# Patient Record
Sex: FEMALE
[De-identification: names, ages, dates, MRNs, and addresses within clinical notes are randomized per-mention and may not be internally consistent; named-entity substitution may affect disease eponyms.]

---

## 2022-12-05 ENCOUNTER — HOSPITAL ENCOUNTER (EMERGENCY)
Dept: HOSPITAL 97 - ER | Age: 22
Discharge: LEFT BEFORE DISCHARGE | End: 2022-12-05
Payer: COMMERCIAL

## 2022-12-05 DIAGNOSIS — Z02.9: Primary | ICD-10-CM

## 2022-12-05 NOTE — XMS REPORT
Continuity of Care Document

                           Created on:2022



Patient:ARTEMIO TRIVEDI

Sex:Female

:2000

External Reference #:[id number]





Demographics







                          Address                   [address] [address]



                                                    [place] [address]

 

                          Home Phone                [phone]

 

                          Work Phone                [phone]

 

                          Mobile Phone              [phone]

 

                          Email Address             NONE

 

                          Preferred Language        en

 

                          Marital Status            Unknown

 

                          M[employer]on Affiliation     Unknown

 

                          Race                      Unknown

 

                          Additional Race(s)        White

 

                          Ethnic Group               or 









Author







                          Organization              [facility]

t

 

                          Address                   [address]



                                                    [place] [address]

 

                          Phone                     [phone]









Support







                Name            Relationship    Address         Phone

 

                Penny Perez Mother          [id number] [phone]



                                                [place] [address] 

 

                Penny Trivedi Mother          [id number] [phone]



                                                [place] [address] 

 

                Scott Medellin  Significant Other [address] [facility]  [phone]

[phone]



                                                [place] [address] 









Care Team Providers







                    Name                Role                Phone

 

                    Pcp, Patient Does Not Have A Primary Care Physician [phone]



 

                    Rory Razo Attending Clinician [phone]

 

                    Nurse, Amilcar Rmchp Exp Cprit Obgyn Attending Clinician Unavail

able

 

                    RORY SALDAÑA  Attending Clinician Unavailable

 

                    Doctor Unassigned, No Name Attending Clinician Unavailable

 

                    CODIE GALLAGHER Attending Clinician Unavailable

 

                    BHARAT VILLALTA   Attending Clinician Unavailable

 

                    Destiny Perera DO   Attending Clinician [phone]

 

                    Bharat Villalta MD Attending Clinician [phone]

 

                    Gillian Alvarez MD Attending Clinician [phone]

 

                    Scarlet Lopez MD Attending Clinician [phone]

 

                    MADALYN LOGAN   Attending Clinician Unavailable

 

                    Madalyn Myers Attending Clinician [phone]

 

                    ANNE ZHENG      Attending Clinician Unavailable

 

                    ANNE ZHENG      Attending Clinician Unavailable

 

                    Anne Zheng MD   Attending Clinician [phone]

 

                    BRIANA MARQUEZ      Attending Clinician Unavailable

 

                    Briana Marquez MD   Attending Clinician [phone]

 

                    Lab, Amilcar-Rmchp      Attending Clinician Unavailable

 

                    Akinsipe WHROBBYP, Codie C Attending Clinician [phone]

[phone]

 

                    Ultrasound, Ang-Mfm Attending Clinician Unavailable

 

                    Khai Tolliver MD  Attending Clinician [phone]

 

                    KHAI TOLLIVER     Attending Clinician Unavailable

 

                    RACHELE JOEL       Attending Clinician Unavailable

 

                    Rachele Joel MD    Attending Clinician [phone]

 

                    Bhavna Amezcua MD Attending Clinician [phone]

[phone]

 

                    BHAVNA AMEZCUA Attending Clinician Unavailable

 

                    Nurse, Adc Pob Immunization Attending Clinician Unavailable

 

                    Jared Hudson DO Attending Clinician [phone]

 

                    JARED HUDSON Attending Clinician Unavailable

 

                    Viviane Hudson MD Attending Clinician [phone]

 

                    Kirit Urias DO  Attending Clinician [phone]

 

                    BHARAT VILLALTA   Admitting Clinician Unavailable

 

                    Bharat Villalta MD Admitting Clinician [phone]

 

                    ANNE ZHENG      Admitting Clinician Unavailable

 

                    Anne Zheng MD   Admitting Clinician [phone]

 

                    BRIANA MARQUEZ      Admitting Clinician Unavailable

 

                    Briana Marquez MD   Admitting Clinician [phone]

 

                    RACHELE JOEL       Admitting Clinician Unavailable

 

                    Rachele Joel MD    Admitting Clinician [phone]









Payers







           Payer Name Policy Type Policy Number Effective Date Expiration Date S

[facility]

 

           MEDICAID PENDING            PENDING    2021            



                                            00:00:00              

 

           MEDICAID [place]            [id number]  2021            



                                            00:00:00              







Problems







       Condition Condition Condition Status Onset  Resolution Last   Treating Co

mments 

Source



       Name   Details Category        Date   Date   Treatment Clinician        



                                                 Date                 

 

       Depression Depression Disease Active                              U

nivers



       ,      ,                    3-16                               ity of



       unspecifie unspecifie               00:00:                             Alfred figueroa      d                    00                                 Medical



       depression depression                                                  Br

anch



       type   type                                                    

 

       BMI    BMI    Disease Active                              Univers



       33.0-33.9, 33.0-33.9,               2-28                               it

y of



       adult  adult                00:00:                             [place]



                                   00                                 Medical



                                                                      Branch

 

       Encounter Encounter Disease Active                              Uni

vers



       for    for                  2-28                               ity of



       initial initial               00:00:                             [place]



       prescripti prescripti               00                                 Me

dical



       on of  on of                                                   Branch



       vaginal vaginal                                                  



       ring   ring                                                    



       hormonal hormonal                                                  



       contracept contracept                                                  



       smooth    smooth                                                     

 

       Obstetrica Obstetrica Disease Active 2021                             U

nivers



       l      l                    2-27                               ity of



       laceration laceration               00:00:                             [place]

[place]



                                   00                                 [facility]

 

             Disease Active 2021                             Univers



       (normal (normal               2-27                               ity of



       spontaneou spontaneou               00:00:                             [place]

[place]



       s vaginal s vaginal               00                                 Medi

antionette



       delivery) delivery)                                                  Bran

ch

 

       Single Single Disease Active 2021                             Univers



       live   live                 2-27                               ity of



                       00:00:                             [address]

 

       Anemia, Anemia, Disease Active 2021                             Univers



       postpartum postpartum               2-26                               it

y of



                                   00:00:                             [address]

 

       39 weeks 39 weeks Disease Active 2021                             Unive

rs



       gestation gestation               2-24                               ity 

of



       of     of                   00:00:                             [place]



       pregnancy pregnancy               00                                 [facility]

 

       UTI    UTI    Disease Active                       Overview: Univer

s



       (urinary (urinary                                       Formattin ity

 of



       tract  tract                00:00:                      g of this [place]



       infection) infection)               00                          note   Me

dical



       during during                                           might be Branch



       pregnancy pregnancy                                           different 



                                                               from the 



                                                               original. 



                                                               neg jennifer 

 

       Chlamydia Chlamydia Disease Active                       Overview: 

Univers



       infection infection                                       Formattin i

ty of



       during during               00:00:                      g of this [place]



       pregnancy pregnancy               00                          note   Medi

antionette



                                                               might be Branch



                                                               different 



                                                               from the 



                                                               original. 



                                                               Neg jennifer 

 

       Anemia of Anemia of Disease Active                              Uni

vers



       mother in mother in                                              ity 

of



       pregnancy, pregnancy,               00:00:                             [place]

[place]



       antepartum antepartum               00                                 Me

dical



                                                                      Branch

 

       Rubella Rubella Disease Active                       Overview: Univ

ers



       non-immune non-immune                                       Formattin

 ity of



       status, status,               00:00:                      g of this [place]



       antepartum antepartum               00                          note   Me

dical



                                                               might be Branch



                                                               different 



                                                               from the 



                                                               original. 



                                                               Address 



                                                               pp     

 

       Susceptibl Susceptibl Disease Active                       Overview

: Univers



       e to   e to                                         Formattin ity of



       varicella varicella               00:00:                      g of this [place]

[place]



       (non-immun (non-immun               00                          note   Me

dical



       e),    e),                                              might be Branch



       currently currently                                           different 



       pregnant pregnant                                           from the 



                                                               original. 



                                                               Address 



                                                               pp     

 

       Supervisio Supervisio Disease Active                              U

nivers



       n of   n of                                                ity of



       high-risk high-risk               00:00:                             [place]

[place]



       pregnancy pregnancy               00                                 Medi

antionette



       with   with                                                    Branch



       insufficie insufficie                                                  



       nt     nt                                                      



       prenatal prenatal                                                  



       care   care                                                    

 

       Class 1 Class 1 Disease Active                              Univers



       obesity obesity                                              ity of



       with body with body               00:00:                             [place]

[place]



       mass index mass index               00                                 Me

dical



       (BMI) of (BMI) of                                                  Branch



       33.0 to 33.0 to                                                  



       33.9 in 33.9 in                                                  



       adult, adult,                                                  



       unspecifie unspecifie                                                  



       d obesity d obesity                                                  



       type,  type,                                                   



       unspecifie unspecifie                                                  



       d whether d whether                                                  



       serious serious                                                  



       comorbidit comorbidit                                                  



       y present y present                                                  







Allergies, Adverse Reactions, Alerts







       Allergy Allergy Status Severity Reaction(s) Onset  Inactive Treating Comm

ents 

Source



       Name   Type                        Date   Date   Clinician        

 

       PENICILL DRUG   Active        Rash                         Univers



       IN     INGREDI                                              ity of



                                          00:00:                      [place]



                                          00                          Medical



                                                                      Branch

 

       Penicill Propensi Active        Rash                         Univer

s



       in     ty to                                               ity of



              adverse                      00:00:                      [place]



              reaction                      00                          Medical



              s                                                       Branch







Social History







           Social Habit Start Date Stop Date  Quantity   Comments   Source

 

           History SDOH                                             University o

f



           Alcohol Std                                             [place] [facility]



           Drinks                                                 Branch

 

           History SDOH                                             University o

f



           Alcohol Binge                                             [place] [facility]

al



                                                                  Branch

 

           History SDOH                                             University o

f



           Alcohol Frequency                                             [place] M

edical



                                                                  Branch

 

           Exposure to 2022 Not sure              University of



           SARS-CoV-2 00:00:00   15:06:00                         [facility]



           (event)                                                Branch

 

           Alcohol intake 2022 Current drinker            Unive

rsity of



                      00:00:00   00:00:00   of alcohol            [place] [facility]



                                            (finding)             Branch

 

           Alcohol Comment 2022 social                Universit

y of



                      00:00:00   00:00:00                         [facility]

 

           Tobacco use and 2022 Never used            Universit

y of



           exposure   00:00:00   00:00:00                         [place] [facility]



                                                                  [place]

 

           History of            2021 Smoker                University of



           tobacco use            00:00:00                         [facility]

 

           Sex Assigned At 2000                       Universit

y of



           Birth      00:00:00   00:00:00                         [facility]









                Smoking Status  Start Date      Stop Date       Source

 

                Former smoker   2022 00:00:00 2022 00:00:00 [facility] 

Medical



                                                                Branch







Medications







       Ordered Filled Start  Stop   Current Ordering Indication Dosage Frequency

 Signature

                    Comments            Components          Source



     Medication Medication Date Date Medication? Clinician                (SIG) 

          



     Name Name                                                   

 

     NUVARING            Yes       [id number] 1{each}      Insert 1        

   Univers



     (NUVARING)      3-16                               Each into           ity 

of



     0.12-0.015      00:00:                               vagina           [place]



     mg/24 hr      00                                 once every           Medic

al



     vaginal                                         month.           Branch



     insert                                         Insert           



                                                  vaginally           



                                                  and leave           



                                                  in place           



                                                  for 3           



                                                  consecutiv           



                                                  e weeks,           



                                                  then           



                                                  remove for           



                                                  1 week.           

 

     NUVARING            Yes       [id number] 1{each}      Insert 1        

   Univers



     (NUVARING)      3-16                               Each into           ity 

of



     0.12-0.015      00:00:                               vagina           [place]



     mg/24 hr      00                                 once every           Medic

al



     vaginal                                         month.           Branch



     insert                                         Insert           



                                                  vaginally           



                                                  and leave           



                                                  in place           



                                                  for 3           



                                                  consecutiv           



                                                  e weeks,           



                                                  then           



                                                  remove for           



                                                  1 week.           

 

     NUVARING            Yes       [id number] 1{each}      Insert 1        

   Univers



     (NUVARING)      3-16                               Each into           ity 

of



     0.12-0.015      00:00:                               vagina           [place]



     mg/24 hr      00                                 once every           Medic

al



     vaginal                                         month.           Branch



     insert                                         Insert           



                                                  vaginally           



                                                  and leave           



                                                  in place           



                                                  for 3           



                                                  consecutiv           



                                                  e weeks,           



                                                  then           



                                                  remove for           



                                                  1 week.           

 

     NUVARING            Yes       [id number] 1{each}      Insert 1        

   Univers



     (NUVARING)      3-16                               Each into           ity 

of



     0.12-0.015      00:00:                               vagina           [place]



     mg/24 hr      00                                 once every           Medic

al



     vaginal                                         month.           Branch



     insert                                         Insert           



                                                  vaginally           



                                                  and leave           



                                                  in place           



                                                  for 3           



                                                  consecutiv           



                                                  e weeks,           



                                                  then           



                                                  remove for           



                                                  1 week.           

 

     NUVARING            Yes       [id number] 1{each}      Insert 1        

   Univers



     (NUVARING)      3-16                               Each into           ity 

of



     0.12-0.015      00:00:                               vagina           [place]



     mg/24 hr      00                                 once every           Medic

al



     vaginal                                         month.           Branch



     insert                                         Insert           



                                                  vaginally           



                                                  and leave           



                                                  in place           



                                                  for 3           



                                                  consecutiv           



                                                  e weeks,           



                                                  then           



                                                  remove for           



                                                  1 week.           

 

     NUVARING            Yes       [id number] 1{each}      Insert 1        

   Univers



     (NUVARING)      3-16                               Each into           ity 

of



     0.12-0.015      00:00:                               vagina           [place]



     mg/24 hr      00                                 once every           [facility]

al



     vaginal                                         month.           [facility]



     insert                                         Insert           



                                                  vaginally           



                                                  and leave           



                                                  in place           



                                                  for 3           



                                                  consecutiv           



                                                  e weeks,           



                                                  then           



                                                  remove for           



                                                  1 week.           

 

     NUVARING            Yes       [id number] 1{each}      Insert 1        

   [facility]



     (NUVARING)      3-16                               Each into           [facility] 

of



     0.12-0.015      00:00:                               vagina           [place]



     mg/24 hr      00                                 once every           [facility]

al



     vaginal                                         month.           [facility]



     insert                                         Insert           



                                                  vaginally           



                                                  and leave           



                                                  in place           



                                                  for 3           



                                                  consecutiv           



                                                  e weeks,           



                                                  then           



                                                  remove for           



                                                  1 week.           

 

     ferrous      2021      Yes       [id number] 325mg      Take 1           [facility]

[facility]



     sulfate 325      2-26                               tablet by           [facility]

 of



     mg (65 mg      00:00:                               mouth 3           [place]



     iron)      00                                 (three)           [facility]



     tablet                                         times           [facility]



                                                  daily with           



                                                  meals.           

 

     docusate      2021      Yes       [id number] 240mg      Take 1           [facility]

[facility]



     calcium 240      2-26                               capsule by           it

y of



     mg capsule      00:00:                               mouth once           [facility]

[facility]



               00                                 daily as           [facility]



                                                  needed for           [facility]



                                                  Constipati           



                                                  on.            

 

     ferrous      2021      Yes       [id number] 325mg      Take 1           [facility]

[facility]



     sulfate 325      2-26                               tablet by           [facility]

 of



     mg (65 mg      00:00:                               mouth 2           [place]



     iron)      00                                 (two)           [facility]



     tablet                                         times           [facility]



                                                  daily.           

 

     ibuprofen      2021      Yes       [id number] 600mg      Take 1           

[facility]



     600 mg      2-26                               tablet by           [facility]



     tablet      00:00:                               mouth           [place]



               00                                 every 6           [facility]



                                                  (six)           [facility]



                                                  hours as           



                                                  needed           



                                                  (Pain).           



                                                  Take with           



                                                  food or           



                                                  milk.           

 

     ferrous      2021      Yes       [id number] 325mg      Take 1           [facility]

[facility]



     sulfate 325      2-26                               tablet by           [facility]

 of



     mg (65 mg      00:00:                               mouth 3           [place]



     iron)      00                                 (three)           [facility]



     tablet                                         times           [facility]



                                                  daily with           



                                                  meals.           

 

     docusate      2021      Yes       [id number] 240mg      Take 1           [facility]

[facility]



     calcium 240      2-26                               capsule by           it

y of



     mg capsule      00:00:                               mouth once           [facility]

[facility]



               00                                 daily as           [facility]



                                                  needed for           [facility]



                                                  Constipati           



                                                  on.            

 

     ferrous      2021      Yes       [id number] 325mg      Take 1           [facility]

[facility]



     sulfate 325      2-26                               tablet by           [facility]

 of



     mg (65 mg      00:00:                               mouth 2           [place]



     iron)      00                                 (two)           [facility]



     tablet                                         times           [facility]



                                                  daily.           

 

     ibuprofen      2021      Yes       [id number] 600mg      Take 1           

[facility]



     600 mg      2-26                               tablet by           [facility]



     tablet      00:00:                               mouth           [place]



               00                                 every 6           [facility]



                                                  (six)           [facility]



                                                  hours as           



                                                  needed           



                                                  (Pain).           



                                                  Take with           



                                                  food or           



                                                  milk.           

 

     ferrous      2021      Yes       [id number] 325mg      Take 1           [facility]

[facility]



     sulfate 325      2-26                               tablet by           [facility]

 of



     mg (65 mg      00:00:                               mouth 3           [place]



     iron)      00                                 (three)           [facility]



     tablet                                         times           [facility]



                                                  daily with           



                                                  meals.           

 

     docusate      2021      Yes       [id number] 240mg      Take 1           [facility]

[facility]



     calcium 240      2-26                               capsule by           it

y of



     mg capsule      00:00:                               mouth once           [facility]

[facility]



               00                                 daily as           [facility]



                                                  needed for           [facility]



                                                  Constipati           



                                                  on.            

 

     ferrous      2021      Yes       [id number] 325mg      Take 1           [facility]

[facility]



     sulfate 325      2-26                               tablet by           [facility]

 of



     mg (65 mg      00:00:                               mouth 2           [place]



     iron)      00                                 (two)           [facility]



     tablet                                         times           [facility]



                                                  daily.           

 

     ibuprofen      2021      Yes       [id number] 600mg      Take 1           

[facility]



     600 mg      2-26                               tablet by           [facility]



     tablet      00:00:                               mouth           [place]



               00                                 every 6           [facility]



                                                  (six)           [facility]



                                                  hours as           



                                                  needed           



                                                  (Pain).           



                                                  Take with           



                                                  food or           



                                                  milk.           

 

     ferrous      2021      Yes       [id number] 325mg      Take 1           [facility]

[facility]



     sulfate 325      2-26                               tablet by           [facility]

 of



     mg (65 mg      00:00:                               mouth 3           [place]



     iron)      00                                 (three)           [facility]



     tablet                                         times           [facility]



                                                  daily with           



                                                  meals.           

 

     docusate      2021      Yes       [id number] 240mg      Take 1           [facility]

[facility]



     calcium 240      2-26                               capsule by           it

y of



     mg capsule      00:00:                               mouth once           [facility]

[facility]



               00                                 daily as           [facility]



                                                  needed for           [facility]



                                                  Constipati           



                                                  on.            

 

     ferrous      2021      Yes       [id number] 325mg      Take 1           [facility]

[facility]



     sulfate 325      2-26                               tablet by           [facility]

 of



     mg (65 mg      00:00:                               mouth 2           [place]



     iron)      00                                 (two)           [facility]



     tablet                                         times           [facility]



                                                  daily.           

 

     ibuprofen      2021      Yes       [id number] 600mg      Take 1           

[facility]



     600 mg      2-26                               tablet by           [facility]



     tablet      00:00:                               mouth           [place]



               00                                 every 6           [facility]



                                                  (six)           [facility]



                                                  hours as           



                                                  needed           



                                                  (Pain).           



                                                  Take with           



                                                  food or           



                                                  milk.           

 

     ferrous      2021      Yes       [id number] 325mg      Take 1           [facility]

[facility]



     sulfate 325      2-26                               tablet by           [facility]

 of



     mg (65 mg      00:00:                               mouth 3           [place]



     iron)      00                                 (three)           [facility]



     tablet                                         times           [facility]



                                                  daily with           



                                                  meals.           

 

     docusate      2021      Yes       [id number] 240mg      Take 1           [facility]

[facility]



     calcium 240      2-26                               capsule by           it

y of



     mg capsule      00:00:                               mouth once           [facility]

[facility]



               00                                 daily as           [facility]



                                                  needed for           [facility]



                                                  Constipati           



                                                  on.            

 

     ferrous      2021      Yes       [id number] 325mg      Take 1           [facility]

[facility]



     sulfate 325      2-26                               tablet by           [facility]

 of



     mg (65 mg      00:00:                               mouth 2           [place]



     iron)      00                                 (two)           [facility]



     tablet                                         times           [facility]



                                                  daily.           

 

     ibuprofen      2021      Yes       [id number] 600mg      Take 1           

[facility]



     600 mg      2-26                               tablet by           [facility]



     tablet      00:00:                               mouth           [place]



               00                                 every 6           [facility]



                                                  (six)           [facility]



                                                  hours as           



                                                  needed           



                                                  (Pain).           



                                                  Take with           



                                                  food or           



                                                  milk.           

 

     ferrous      2021      Yes       [id number] 325mg      Take 1           [facility]

[facility]



     sulfate 325      2-26                               tablet by           [facility]

 of



     mg (65 mg      00:00:                               mouth 3           [place]



     iron)      00                                 (three)           [facility]



     tablet                                         times           [facility]



                                                  daily with           



                                                  meals.           

 

     docusate      2021      Yes       [id number] 240mg      Take 1           [facility]

[facility]



     calcium 240      2-26                               capsule by           it

y of



     mg capsule      00:00:                               mouth once           [facility]

[facility]



               00                                 daily as           [facility]



                                                  needed for           [facility]



                                                  Constipati           



                                                  on.            

 

     ferrous      2021      Yes       [id number] 325mg      Take 1           [facility]

[facility]



     sulfate 325      2-26                               tablet by           [facility]

 of



     mg (65 mg      00:00:                               mouth 2           [place]



     iron)      00                                 (two)           [facility]



     tablet                                         times           [facility]



                                                  daily.           

 

     ibuprofen      2021      Yes       [id number] 600mg      Take 1           

[facility]



     600 mg      2-26                               tablet by           [facility]



     tablet      00:00:                               mouth           [place]



               00                                 every 6           [facility]



                                                  (six)           [facility]



                                                  hours as           



                                                  needed           



                                                  (Pain).           



                                                  Take with           



                                                  food or           



                                                  milk.           

 

     ferrous      2021      Yes       [id number] 325mg      Take 1           [facility]

[facility]



     sulfate 325      2-26                               tablet by           [facility]

 of



     mg (65 mg      00:00:                               mouth 3           [place]



     iron)      00                                 (three)           [facility]



     tablet                                         times           [facility]



                                                  daily with           



                                                  meals.           

 

     docusate      2021      Yes       [id number] 240mg      Take 1           [facility]

[facility]



     calcium 240      2-26                               capsule by           it

y of



     mg capsule      00:00:                               mouth once           [facility]

[facility]



               00                                 daily as           [facility]



                                                  needed for           [facility]



                                                  Constipati           



                                                  on.            

 

     ferrous      2021      Yes       [id number] 325mg      Take 1           [facility]

[facility]



     sulfate 325      2-26                               tablet by           [facility]

 of



     mg (65 mg      00:00:                               mouth 2           [place]



     iron)      00                                 (two)           [facility]



     tablet                                         times           [facility]



                                                  daily.           

 

     ibuprofen      2021      Yes       [id number] 600mg      Take 1           

[facility]



     600 mg      2-26                               tablet by           [facility]



     tablet      00:00:                               mouth           [place]



               00                                 every 6           [facility]



                                                  (six)           [facility]



                                                  hours as           



                                                  needed           



                                                  (Pain).           



                                                  Take with           



                                                  food or           



                                                  milk.           

 

     ascorbic            Yes       [id number] 500mg      Take 1           [facility]

[facility]



     acid,      7-27                               tablet by           [facility]



     vitamin C,      00:00:                               mouth 3           [place]

[facility]



     500 mg      00                                 (three)           [facility]



     tablet                                         times           [facility]



                                                  daily.           

 

     ascorbic            Yes       [id number] 500mg      Take 1           [facility]

[facility]



     acid,      7-27                               tablet by           [facility]



     vitamin C,      00:00:                               mouth 3           [place]

[facility]



     500 mg      00                                 (three)           [facility]



     tablet                                         times           [facility]



                                                  daily.           

 

     ascorbic            Yes       [id number] 500mg      Take 1           [facility]

[facility]



     acid,      7-27                               tablet by           [facility]



     vitamin C,      00:00:                               mouth 3           [place]

[facility]



     500 mg      00                                 (three)           [facility]



     tablet                                         times           [facility]



                                                  daily.           

 

     ascorbic            Yes       [id number] 500mg      Take 1           [facility]

[facility]



     acid,      7-27                               tablet by           [facility]



     vitamin C,      00:00:                               mouth 3           [place]

[facility]



     500 mg      00                                 (three)           [facility]



     tablet                                         times           [facility]



                                                  daily.           

 

     ascorbic            Yes       [id number] 500mg      Take 1           [facility]

[facility]



     acid,      7-27                               tablet by           [facility]



     vitamin C,      00:00:                               mouth 3           [place]

[facility]



     500 mg      00                                 (three)           [facility]



     tablet                                         times           [facility]



                                                  daily.           

 

     ascorbic            Yes       [id number] 500mg      Take 1           [facility]

[facility]



     acid,      7-27                               tablet by           [facility]



     vitamin C,      00:00:                               mouth 3           [place]

[facility]



     500 mg      00                                 (three)           [facility]



     tablet                                         times           [facility]



                                                  daily.           

 

     ascorbic            Yes       [id number] 500mg      Take 1           [facility]

[facility]



     acid,      7-27                               tablet by           [facility]



     vitamin C,      00:00:                               mouth 3           [place]

[facility]



     500 mg      00                                 (three)           [facility]



     tablet                                         times           [facility]



                                                  daily.           

 

     prenatal            Yes       [id number] 1{tbl}      Take 1         

  [facility]



     multivitami      7-26                09             tablet by           [facility]

 of



     n (PRENATAL      00:00:                               mouth           [place]



     VITAMIN)      00                                 daily.           [facility]



     tablet                                                        [facility]

 

     prenatal            Yes       [id number] 1{tbl}      Take 1         

  [facility]



     multivitami      7-26                09             tablet by           [facility]

 of



     n (PRENATAL      00:00:                               mouth           [place]



     VITAMIN)      00                                 daily.           [facility]



     tablet                                                        [facility]

 

     prenatal            Yes       [id number] 1{tbl}      Take 1         

  [facility]



     multivitami      7-26                09             tablet by           [facility]

 of



     n (PRENATAL      00:00:                               mouth           [place]



     VITAMIN)      00                                 daily.           [facility]



     tablet                                                        [facility]

 

     prenatal            Yes       [id number] 1{tbl}      Take 1         

  [facility]



     multivitami      7-26                09             tablet by           [facility]

 of



     n (PRENATAL      00:00:                               mouth           [place]



     VITAMIN)      00                                 daily.           [facility]



     tablet                                                        [facility]

 

     prenatal            Yes       [id number] 1{tbl}      Take 1         

  Univers



     multivitami      7-26                09             tablet by           ity

 of



     n (PRENATAL      00:00:                               mouth           [place]



     VITAMIN)      00                                 daily.           Medical



     tablet                                                        Branch

 

     prenatal            Yes       [id number] 1{tbl}      Take 1         

  Univers



     multivitami      7-26                09             tablet by           ity

 of



     n (PRENATAL      00:00:                               mouth           [place]



     VITAMIN)      00                                 daily.           Medical



     tablet                                                        Branch

 

     prenatal            Yes       [id number] 1{tbl}      Take 1         

  Univers



     multivitami      7-26                09             tablet by           ity

 of



     n (PRENATAL      00:00:                               mouth           [place]



     VITAMIN)      00                                 daily.           Medical



     tablet                                                        Branch







Immunizations







           Ordered    Filled Immunization Date       Status     Comments   [facility]



           Immunization Name Name                                        

 

           [facility]                  2022 Completed             [facility]



                                 00:00:00                         [facility]

 

           HPV9                  2022 Completed             University of



                                 00:00:00                         [facility]

 

           HPV9                  2022 Completed             [facility] of



                                 00:00:00                         [facility]

 

           HPV9                  2022 Completed             [facility] of



                                 00:00:00                         [facility]

 

           HPV9                  2022 Completed             [facility] of



                                 00:00:00                         [facility]

 

           HPV9                  2022 Completed             [facility] of



                                 00:00:00                         [facility]

 

           HPV9                  2022 Completed             [facility] of



                                 00:00:00                         [facility]

 

           HPV9                  2022 Completed             [facility] of



                                 00:00:00                         [facility]

 

           HPV9                  2022 Completed             [facility] of



                                 00:00:00                         [facility]

 

           Varicella             2021 Completed             University of



           (varivax)(chicken            00:00:00                         [place] [facility]

edical



           pox)                                                   Branch

 

           MMR                   2021 Completed             [facility] of



                                 00:00:00                         [facility]

 

           HPV9                  2021 Completed             [facility] of



                                 00:00:00                         [facility]

 

           Varicella             2021 Completed             University of



           (varivax)(chicken            00:00:00                         [place] [facility]

edical



           pox)                                                   Branch

 

           MMR                   2021 Completed             University of



                                 00:00:00                         [facility]



                                                                  Branch

 

           HPV9                  2021 Completed             [facility] of



                                 00:00:00                         [facility]

 

           Varicella             2021 Completed             [facility] of



           (varivax)(chicken            00:00:00                         [place] [facility]

edical



           pox)                                                   Branch

 

           MMR                   2021 Completed             [facility] of



                                 00:00:00                         [facility]

 

           HPV9                  2021 Completed             [facility]



                                 00:00:00                         [facility]

 

           Varicella             2021 Completed             University of



           (varivax)(chicken            00:00:00                         [place] [facility]

edical



           pox)                                                   Branch

 

           MMR                   2021 Completed             [facility]



                                 00:00:00                         [facility]

 

           HPV9                  2021 Completed             [facility]



                                 00:00:00                         [facility]

 

           Varicella             2021 Completed             [facility] of



           (varivax)(chicken            00:00:00                         [place] [facility]

edical



           pox)                                                   Branch

 

           MMR                   2021 Completed             [facility]



                                 00:00:00                         [facility]

 

           HPV9                  2021 Completed             [facility] of



                                 00:00:00                         [facility]

 

           Varicella             2021 Completed             [facility] of



           (varivax)(chicken            00:00:00                         [place] [facility]

edical



           pox)                                                   Branch

 

           MMR                   2021 Completed             [facility] of



                                 00:00:00                         [facility]

 

           HPV9                  2021 Completed             [facility]



                                 00:00:00                         [facility]

 

           Varicella             2021 Completed             University of



           (varivax)(chicken            00:00:00                         [place] [facility]

edical



           pox)                                                   Branch

 

           MMR                   2021 Completed             [facility] of



                                 00:00:00                         [facility]

 

           HPV9                  2021 Completed             [facility] of



                                 00:00:00                         [facility]

 

           MMR                   2021 Completed             [facility] of



                                 00:00:00                         [facility]

 

           MMR                   2021 Completed             [facility] of



                                 00:00:00                         [facility]

 

           MMR                   2021 Completed             [facility] of



                                 00:00:00                         [facility]

 

           MMR                   2021 Completed             [facility] of



                                 00:00:00                         [facility]

 

           MMR                   2021 Completed             [facility] of



                                 00:00:00                         [facility]

 

           MMR                   2021 Completed             [facility] of



                                 00:00:00                         [facility]

 

           MMR                   2021 Completed             University of



                                 00:00:00                         [facility]

 

           TDAP                  2021-10-11 Completed             University of



                                 00:00:00                         [facility]

 

           SARS-COV-2 COVID-19            2021-10-11 Completed             Unive

rsity of



           PFIZER VACCINE            00:00:00                         [facility]

 

           TDAP                  2021-10-11 Completed             University of



                                 00:00:00                         [facility]

 

           SARS-COV-2 COVID-19            2021-10-11 Completed             Unive

rsity of



           PFIZER VACCINE            00:00:00                         [facility]

 

           TDAP                  2021-10-11 Completed             University of



                                 00:00:00                         [facility]

 

           SARS-COV-2 COVID-19            2021-10-11 Completed             Unive

rsity of



           PFIZER VACCINE            00:00:00                         [facility]

 

           TDAP                  2021-10-11 Completed             University of



                                 00:00:00                         [facility]

 

           SARS-COV-2 COVID-19            2021-10-11 Completed             Unive

rsity of



           PFIZER VACCINE            00:00:00                         [facility]

 

           TDAP                  2021-10-11 Completed             University of



                                 00:00:00                         [facility]

 

           SARS-COV-2 COVID-19            2021-10-11 Completed             Unive

rsity of



           PFIZER VACCINE            00:00:00                         [facility]

 

           TDAP                  2021-10-11 Completed             University of



                                 00:00:00                         [facility]

 

           SARS-COV-2 COVID-19            2021-10-11 Completed             Unive

rsity of



           PFIZER VACCINE            00:00:00                         [facility]

 

           TDAP                  2021-10-11 Completed             University of



                                 00:00:00                         [facility]

 

           SARS-COV-2 COVID-19            2021-10-11 Completed             Unive

rsity of



           PFIZER VACCINE            00:00:00                         [facility]

 

           SARS-COV-2 COVID-19            2021 Completed             Unive

rsity of



           PFIZER VACCINE            00:00:00                         [facility]

 

           SARS-COV-2 COVID-19            2021 Completed             Unive

rsity of



           PFIZER VACCINE            00:00:00                         [facility]

 

           SARS-COV-2 COVID-19            2021 Completed             Unive

rsity of



           PFIZER VACCINE            00:00:00                         [facility]

 

           SARS-COV-2 COVID-19            2021 Completed             Unive

rsity of



           PFIZER VACCINE            00:00:00                         [facility]

 

           SARS-COV-2 COVID-19            2021 Completed             Unive

rsity of



           PFIZER VACCINE            00:00:00                         [facility]

 

           SARS-COV-2 COVID-19            2021 Completed             Unive

rsity of



           PFIZER VACCINE            00:00:00                         [facility]

 

           SARS-COV-2 COVID-19            2021 Completed             Unive

rsity of



           PFIZER VACCINE            00:00:00                         [place] [facility]

antionette



                                                                  Branch







Vital Signs







             Vital Name   Observation Time Observation Value Comments     Source

 

             Systolic blood 2022 20:06:00 113 mm[Hg]                Univer

sity of



             pressure                                            [facility]

 

             Diastolic blood 2022 20:06:00 59 mm[Hg]                 Unive

rsity of



             pressure                                            [facility]

 

             Heart rate   2022 20:06:00 88 /min                   Universi

ty [facility]

 

             Body temperature 2022 20:06:00 36.72 Radha                 [facility]

 

             Respiratory rate 2022 20:06:00 18 /min                   [facility]

 

             Body weight  2022 20:06:00 102.967 kg                [facility]

 

             Systolic blood 2022 20:44:00 129 mm[Hg]                Univer

sity of



             [facility]

 

             Diastolic blood 2022 20:44:00 71 mm[Hg]                 Unive

rsity of



             [facility]

 

             Heart rate   2022 20:44:00 77 /min                   [facility]

[facility]

 

             Body temperature 2022 20:44:00 36.06 Radha                 [facility]

 

             Respiratory rate 2022 20:44:00 16 /min                   [facility]

 

             Body height  2022 20:44:00 167.6 cm                  [facility]

ty [facility]

 

             Body weight  2022 20:44:00 94.303 kg                 [facility]

 

             BMI          2022 20:44:00 33.56 kg/m2               [facility]







Procedures







                Procedure       Date / Time Performed Performing Clinician Ashley diehl

 

                GARDASIL 9 (HPV 9V) 2022 20:07:40 Codie Gallagher

[facility]

 

                ASSIGNMENT OF BENEFITS 2022 19:55:43 Doctor Unassigned, No

 [facility]

 

                POCT PREGNANCY TEST 2022 20:46:00 Rory Saldaña [facility]

rsity of [facility]







Encounters







        Start   End     Encounter Admission Attending Care    Care    Encounter 

Source



        Date/Time Date/Time Type    Type    Clinicians Facility Department ID   

   

 

        2021-10-31         Emergency                 [facility]    [id number] 

Univers



        21:23:17                                                         ity [facility]

 

        2022 Refill          [facility]    1.2.840.114 [id number]

24 Univers



        00:00:00 00:00:00                 Rory CRYSTAL [facility]  [id number]        

 ity of



                                                REGIONAL [id number]         Jack

as



                                                MATERNAL [phone]         Med

ical



                                                & CHILD [address] [place] [address]                 

 

        2022 Nurse           Nurse, Amilcar Rmchp Exp Cprit Obgyn U

[facility]    [id number] 

[id number]                                Univers



        15:00:00 15:12:05 Visit           Rory Saldaña OB/GYN  [id number]

         ity of



                                                REGIONAL [id number]         Jack

as



                                                MATERNAL [phone]         Med

ical



                                                & CHILD [address] [place] [address]                 

 

        [facility]       [facility]    [id number]

678 Univers



        15:00:00 15:00:00                 RORY mendozay o

f



                                                                        [facility]

 

        2022 Outpatient R               [facility]    [id number]

678 Univers



        15:00:00 15:00:00                                                 ity of



                                                                        [facility]

 

        2022 Orders          Doctor CLOUD    1.2.840.114 [id number]

22 Univers



        00:00:00 00:00:00 Only            Unassigned, HEIDY   350.1.13.10       

  ity of



                                        [address]2.7.2.686         Jack

as



                                                        [phone]         [address]

 

        2022 Outpatient R               [facility]    [id number]

016 Univers



        14:00:00 14:00:00                                                 ity of



                                                                        [facility]

 

        2022 [facility]       [facility]    [id number]

016 Univers



        14:00:00 14:00:00                 ESSENCENDA                         ity o

f



                                                                        [facility]

 

        2022 [facility]          [facility]    1.2.840.114 [id number]

19 Univers



        00:00:00 00:00:00                 Rospetronanda R OB/GYN  350.1.13.10        

 ity of



                                                REGIONAL [id number]         Jack

as



                                                MATERNAL [phone]         Med

ical



                                                & CHILD [address] [place] [address]                 

 

        2022 [facility]          [facility]    1.2.840.114 [id number]

54 Univers



        00:00:00 00:00:00                 Essencenda R OB/GYN  350.1.13.10        

 ity of



                                                REGIONAL [id number]         Jack

as



                                                MATERNAL [phone]         Med

ical



                                                & [address]                 

 

        2022 Office          [facility]    1.2.840.114 [id number]

90 Univers



        15:45:00 15:57:13 Visit           Noela R OB/GYN  350.1.13.10        

 ity of



                                                REGIONAL [id number]         Jack

as



                                                MATERNAL [phone]         [address]                 

 

        2022 Outpatient MARAH SALDAÑA [facility]    [id number]

071 Univers



        15:45:00 15:57:13                 ESSENCENDA                         ity o

f



                                                                        [facility]

 

        [facility]    [id number]

071 Univers



        15:45:00 15:45:00                 ESSENCENDA                         ity o

f



                                                                        [facility]

 

        2022 Outpatient MARAH SALDAÑA [facility]    [id number]

765 Univers



        14:45:00 15:28:08                 ESSENCENDA                         ity o

f



                                                                        [facility]

 

        2022 Office          [facility]    1.2.840.114 [id number]

40 Univers



        14:45:00 15:28:08 Visit           Essencenda R OB/GYN  350.1.13.10        

 ity of



                                                REGIONAL [id number]         Jack

as



                                                MATERNAL [phone]         Med

ical



                                                & CHILD [address] [place] [address]                 

 

        [facility]       [facility]    [id number]

765 Univers



        14:45:00 14:45:00                 RORY alonso o

[facility]

 

        2022 Nurse           Nurse, Amilcar Rmchp Exp Cprit Obgyn U

[facility]    1.2.840.114 

[id number]                                Univers



        15:00:00 15:33:23 Visit           Rory Saldaña R OB/GYN  350.1.13.10

         ity of



                                                REGIONAL [id number]         Jack

as



                                                MATERNAL [phone]         Med

ical



                                                & CHILD [address] [place] [address]                 

 

        20[facility] R       [facility]    [id number]

065 Univers



        15:00:00 15:00:00                 RORY cardoza

[facility]

 

        2022 [facility]       [facility]    [id number]

[id number] Univers



        12:45:00 12:45:00                 CODIE mendozamargaux o

[facility]

 

        2022 Orders          Doctor CLOUD    1.2.840.114 [id number]

71 Univers



        00:00:00 00:00:00 Only            Unassigned, HEIDY   350.1.13.10       

  ity of



                                        [facility] [id number]         Jack

as



                                                        [phone]         Medi

antionette



                                                        009             Branch

 

        2021 Inpatient X       [facility]    GAYLE     [id number]

725 Univers



        08:14:00 12:16:00                 BHARAT alonso of



                                                                        [facility]

 

        2021 Hospital         Destiny Perera    1.2.840.11

[id number] Univers



        08:14:00 12:16:00 Encounter         Bharat Villalta   350.1.13.1

0         ity of



                                                [facility] [id number]         Jack

as



                                                        [phone]         Medi

antionette



                                                        134             Branch

 

        2021 Anesthesia         Gillian Alvarez    1.2.8

40.114 [id number] 

Univers



        03:18:00 16:49:00 Event           Scarlet LopezY   350.1.13.10  

       ity of



                                                [facility] [id number]         Jack

as



                                                        [phone]         Medi

antionette



                                                        132             Branch

 

        2021 Outpatient R       [facility]    [id number]

[id number] Univers



        12:45:00 13:13:34                 MADALYN mendozay [facility]

 

        2021 Routine         [place] [facility]    1.2.595.489 [id number]

7190 Univers



        12:45:00 13:13:34 Prenatal         Madalyn BRYANT OB/GYN  350.1.13.10         i

ty of



                        Visit                   REGIONAL 4.2.7.2.686         Jack

as



                                                MATERNAL [phone]         Med

ical



                                                & CHILD [address] [place] [address]                 

 

        2021 Outpatient X       ANNE ZHENG [facility]    GAYLE     

[id number] Univers



        20:21:00 01:08:00                 ANNE ZHENG                        

 [facility]

 

        2021 Hospital         AI Zheng    1.2.840.114 [id number]

168 Univers



        20:21:00 01:08:00 Encounter         Anne MOODY   350.1.13.10        

 ity of



                                                [facility] 4.2.7.2.686         Jack

as



                                                        [phone]         Medi

antionette



                                                        140             Branch

 

        2021 Orders          Doctor  AI    1.2.840.114 [id number]

28 Univers



        00:00:00 00:00:00 Only            Unassigned, HEIDY   350.1.13.10       

  ity of



                                        [facility] 4.2.7.2.686         Jack

as



                                                        [phone]         Medi

antionette



                                                        009             Branch

 

        2021-12-15 2021-12-15 Outpatient X       VIDAL   [facility]    [place]     [id number]

162 Univers



        17:16:00 18:36:00                 BRIANA alonso [facility]

 

        2021-12-15 2021-12-15 Emergency         [facility]    1.2.274.157 [id number]

9386 Univers



        17:16:00 18:36:00                 Briana WESTON 350.1.13.10         

ity of



                                                [place] 4.2.7.2.686         [facility]  [phone]         Medi

antionette



                                                        083             [place]

 

        2021-12-15 2021-12-15 Telephone         Doreen [facility]    1.2.840.114 [id number]

[id number] Univers



        00:00:00 00:00:00                 Madalyn BRYANT OB/GYN  350.1.13.10         it

y of



                                                REGIONAL 4.2.7.2.686         Jack

as



                                                MATERNAL [phone]         Med

ical



                                                & CHILD [address] [place] [address]                 

 

        2021 Technician         Lab, Ang[facility]    1.2.840.

114 [id number] 

Univers



        08:30:00 08:59:19 Visit           Madalyn Logan OB/GYN  350.1.13.10 

        ity of



                                                REGIONAL 4.2.7.2.686         Jack

as



                                                MATERNAL [phone]         [address]                 

 

        2021 Outpatient R       DOREEN [facility]    [id number]

[id number] Univers



        08:30:00 08:30:00                 MADALYN alonso [facility]

 

        2021 Abstract         Elliott [facility]    1.2.840.114 [id number]

[id number] Univers



        00:00:00 00:00:00                 Codie URBINA OB/GYN  350.1.13.10        

 ity of



                                                REGIONAL 4.2.7.2.686         Jack

as



                                                MATERNAL [phone]         Med

ical



                                                & CHILD [address] [place] [address]                 

 

        2021 Routine         Doreen [facility]    1.2.374.623 [id number]

5496 Univers



        16:20:05 16:40:48 Prenatal         Madalyn BRYANT OB/GYN  350.1.13.10         i

ty of



                        Visit                   REGIONAL 4.2.7.2.686         Jack

as



                                                MATERNAL [phone]         Med

ical



                                                & CHILD [address] [place] [address]                 

 

        2021 Outpatient R       DOREEN [facility]    [id number]

[id number] Univers



        15:45:00 16:40:48                 MADALYN alonso [facility]

 

        2021-12-10 2021-12-10 Technician         [facility]    1.2

.840.114 [id number] 

Univers



        09:55:15 10:40:15 Visit           Khai Tolliver OB/GYN  350.1.13.10   

      ity of



                                                REGIONAL 4.2.7.2.686         Jack

as



                                                MATERNAL [phone]         Med

ical



                                                & CHILD [address] [place] [address]                 

 

        2021-12-10 2021-12-10 [facility]    [id number]

[id number] Univers



        10:00:00 10:00:00                 KHAI alonso [facility]

 

        2021 Outpatient X       RACHELE JOEL [facility]    [place]     [id number]

[id number] Univers



        15:22:00 18:40:00                                                 [facility]

 

        2021 Emergency         Rachele Joel [facility]    1.2.840.114 [id number]

[id number] Univers



        15:22:00 18:40:00                 [facility] 350..13.10         i

ty of



                                                [place] 4.2.7.2.686         [place]

[facility]  [phone]         [address]

 

        [facility]    [id number]

[id number] Univers



        07:45:00 07:45:00                 MADALYN alonso [facility]

 

        [facility]21 [facility]    1.2.143.397 [id number]

5848 Univers



        07:52:50 08:24:33 Prenatal         Madalyn BRYANT OB/GYN  350.1.13.10         i

ty of



                        Visit                   REGIONAL 4.2.7.2.686         Jack

as



                                                MATERNAL [phone]         Med

ical



                                                & [address]                 

 

        [facility]    [address]

09546 Univers



        07:45:00 08:24:33                 MADALYN alonso [facility]

 

        2021 Technician         Ultrasound, [facility]    1.2

.840.114 [id number] 

Univers



        09:13:20 09:48:44 Visit           Bhavna Amezcua OB/GYN  350.1.

13.10         ity of



                                                REGIONAL 4.2.7.2.686         Jack

as



                                                MATERNAL [phone]         Med

ical



                                                & CHILD 369             [facility]                 

 

        2021 Outpatient LINDA AMEZCUA  [facility]    [id number]

[id number] Univers



        09:00:00 09:00:00                 BHAVNA                          [facility]

 

        2021 Routine         Doreen [facility]    1.2.847.981 [id number]

3836 Univers



        09:02:58 09:37:42 Prenatal         Madalyn BRYANT OB/GYN  350.1.13.10         i

ty of



                        Visit                   REGIONAL 4.2.7.2.686         Jack

as



                                                MATERNAL [phone]         Med

ical



                                                & CHILD 107             [facility]                 

 

        2021 Outpatient R       DOREEN [facility]    [id number]

[id number] Univers



        09:00:00 09:37:42                 MADALYN                           [facility]

 

        2021 Orders          Doctor CLOUD    1.2.840.114 [id number]

40 Univers



        00:00:00 00:00:00 Only            Unassigned, HEIDY   350.1.13.10       

  ity of



                                        [facility] 4.2.7.2.686         Jack

as



                                                        [phone]         Medi

antionette



                                                        009             [place]

 

        2021-10-22 2021-10-22 Orders          Doctor  AI    1.2.840.114 [id number]

16 Univers



        00:00:00 00:00:00 Only            Unassigned, HEIDY   350.1.13.10       

  ity of



                                        [facility] 4.2.7.2.686         Jack

as



                                                        [phone]         Medi

antionette



                                                        009             [place]

 

        2021-10-11 2021-10-11 Imm/Inj         Nurse, Adc Pob Immunization [facility]  

  1.2.840.114 

[id number]                                Univers



        09:06:53 09:07:27 Visit           Jared Hudson 350.1.13

.10         ity of



                                                [place] 4.2.7.2.686         [place]

[place]



                                                [place] [phone]         Me

dical



                                                [address]                 

 

        2021-10-11 2021-10-11 Outpatient MARAH HUDSON  [facility]    [id number]

[id number] Univers



        09:00:00 09:00:00                 JARED alonso [facility]

 

        2021-10-11 2021-10-11 Routine         [facility]    1.2.134.064 [id number]

0497 Univers



        08:12:47 08:57:38 Prenatal         Codie C OB/GYN  350.1.13.10       

  ity of



                        Visit                   REGIONAL 4.2.7.2.686         Jack

as



                                                MATERNAL [phone]         Med

ical



                                                & [address]                 

 

        2021-10-11 2021-10-11 Outpatient R       ELLIOTT [facility]    [id number]

[id number] Univers



        08:00:00 08:00:00                 CODIE mendozay o

f



                                                                        [facility]

 

        2021 Telephone         [facility]    1.2.840.114 [id number]

[id number] Univers



        00:00:00 00:00:00                 Codie C OB/GYN  350.1.13.10        

 ity of



                                                REGIONAL 4.2.7.2.686         Jack

as



                                                MATERNAL [phone]         [address]                 

 

        2021 Routine         [facility]    1.2.943.999 [id number]

9692 Univers



        13:13:38 13:36:04 Prenatal         Codie C OB/GYN  350.1.13.10       

  ity of



                        Visit                   REGIONAL 4.2.7.2.686         Jack

as



                                                MATERNAL [phone]         [address]                 

 

        2021 Routine         [facility]    1.2.348.395 [id number]

9692 Univers



        13:13:38 13:36:04 Prenatal         Codie C OB/GYN  350.1.13.10       

  ity of



                        Visit                   REGIONAL 4.2.7.2.686         Jack

as



                                                MATERNAL [phone]         Med

ical



                                                & [address]                 

 

        2021 Outpatient R       ELLIOTT [facility]    [id number]

98629 Univers



        13:15:00 13:15:00                 CODIE alonso o

f



                                                                        [facility]

 

        2021 Outpatient MARAH HUDSON  [facility]    [id number]

[id number] Univers



        13:00:00 13:00:00                 JARED alonso [facility]

 

        2021 Outpatient                 [facility]    [id number]

430 Univers



        11:50:00 11:50:00                                                 ity of



                                                                        [facility]

 

        2021 Abstract         [facility]    1.2.840.114 [id number]

[id number] Univers



        00:00:00 00:00:00                 Codie URBINA OB/GYN  350.1.13.10        

 ity of



                                                REGIONAL 4.2.7.2.686         Jack

as



                                                MATERNAL [phone]         Med

ical



                                                & CHILD [address] [place] [address]                 

 

        2021 Abstract         Elliott [facility]    1.2.840.114 [id number]

[id number] Univers



        00:00:00 00:00:00                 Codie URBINA OB/GYN  350.1.13.10        

 ity of



                                                REGIONAL 4.2.7.2.686         Jack

as



                                                MATERNAL [phone]         Med

ical



                                                & CHILD [address] [place] [address]                 

 

        2021 Technician         [facility]    1.2

.840.114 [id number] 

Univers



        13:48:01 14:48:01 Visit           Viviane Hudson OB/GYN  350.1.13.10  

       ity of



                                                REGIONAL 4.2.7.2.686         Jack

as



                                                MATERNAL [phone]         Med

ical



                                                & [address]                 

 

        2021 Technician         Ultrasound, [facility]    1.2

.840.114 [id number] 

Univers



        13:48:01 14:48:01 Visit           Viviane Hudson OB/GYN  350.1.13.10  

       ity of



                                                REGIONAL 4.2.7.2.686         Jack

as



                                                MATERNAL [phone]         Med

ical



                                                & CHILD [address] [place] [address]                 

 

        2021 Outpatient P               [facility]    [id number]

386 Univers



        13:45:00 13:45:00                                                 ity of



                                                                        [facility]

 

        2021 Imm/Inj         Nurse, Pal Pob Immunization [facility]  

  1.2.840.114 

[id number]                                Univers



        11:54:40 11:55:49 Visit           Jared Hudson 350.1.13

.10         ity [facility] 4.2.7.2.686         [place]

[place]



                                                Christie [phone]         Me

dical



                                                [address]                 

 

        2021 Outpatient MARAH HUDSON  [facility]    [id number]

546 Univers



        11:50:00 11:50:00                 JARED                          ity of



                                                                        [facility]

 

        2021 Routine         [facility]    1.2.989.244 [id number]

2997 Univers



        15:06:11 16:02:52 Prenatal         Codie C OB/GYN  350.1.13.10       

  ity of



                        Visit                   REGIONAL 4.2.7.2.686         Jack

as



                                                MATERNAL [phone]         Med

ical



                                                & CHILD [address] [place] [address]                 

 

        [facility]    [id number]

39582 Univers



        15:00:00 15:00:00                 CODIE cardoza

[facility]

 

        2021 [facility]       [facility]    [id number]

16389 Univers



        15:00:00 15:00:00                 CODIE alonso o

niurka



                                                                        [facility]

 

        2021 Telephone         [facility]    1.2.840.114 [id number]

[id number] Univers



        00:00:00 00:00:00                 Codie C OB/GYN  350.1.13.10        

 ity of



                                                REGIONAL 4.2.7.2.686         Jack

as



                                                MATERNAL [phone]         Med

ical



                                                & CHILD [address] [place] [address]                 

 

        2021 Refill          [facility]    1.2.024.792 [id number]

6986 Univers



        00:00:00 00:00:00                 Codie C OB/GYN  350.1.13.10        

 ity of



                                                REGIONAL 4.2.7.2.686         Jack

as



                                                MATERNAL [phone]         Med

ical



                                                & CHILD [address] [place] [address]                 

 

        2021 Refill          [facility]    1.2.527.317 [id number]

4119 Univers



        00:00:00 00:00:00                 Codie C OB/GYN  350.1.13.10        

 ity of



                                                REGIONAL 4.2.7.2.686         Jack

as



                                                MATERNAL [phone]         Med

ical



                                                & CHILD [address] [place] [address]                 

 

        2021 Telephone         [facility]    1.2.840.114 [id number]

[id number] Univers



        00:00:00 00:00:00                 Codie C OB/GYN  350.1.13.10        

 ity of



                                                REGIONAL 4.2.7.2.686         Jack

as



                                                MATERNAL [phone]         Med

ical



                                                & CHILD [address] [place] [address]                 

 

        2021 Telephone         [facility]    1.2.840.114 [id number]

[id number] Univers



        00:00:00 00:00:00                 Codie C OB/GYN  350.1.13.10        

 ity of



                                                REGIONAL 4.2.7.2.686         Jack

as



                                                MATERNAL [phone]         Med

ical



                                                & CHILD [address] [place] [address]                 

 

        2021 Telephone         [facility]    1.2.840.114 [id number]

[id number] Univers



        00:00:00 00:00:00                 Codie C OB/GYN  350.1.13.10        

 ity of



                                                REGIONAL 4.2.7.2.686         Jack

as



                                                MATERNAL [phone]         Med

ical



                                                & CHILD [address] [place] [address]                 

 

        2021 Initial         [facility]    1.2.612.474 [id number]

5383 Univers



        08:23:32 09:33:54 Prenatal         Codie C OB/GYN  350.1.13.10       

  ity of



                        Visit                   REGIONAL 4.2.7.2.686         Jack

as



                                                MATERNAL [phone]         Med

ical



                                                & CHILD [address] [place] [address]                 

 

        2021 [facility]       [facility]    [id number]

[id number] Univers



        08:00:00 08:00:00                 CODIE alonso o

f



                                                                        [facility]

 

        2021 Orders          Doctor CLOUD    1.2.840.114 [id number]

30 Univers



        00:00:00 00:00:00 Only            Unassigned, HEIDY   350.1.13.10       

  ity of



                                        [facility] 4.2.7.2.686         Jack

as



                                                        [phone]         Medi

antionette



                                                        009             Br[place]

 

        2021 Emergency         [facility]    1.2.241.888 [id number]

9498 Univers



        09:23:00 11:37:00                 Kirit Weston 350.1.13.10         i

ty rodrigo Levin 4.2.7.2.686         [place]

[place]



                                                [place]  [phone]         Medi

antionette



                                                        084             [place]

 

        [facility]    [address]19

74221 [facility]



        09:30:00 09:30:00                 MADALYN                           gavin of



                                                                        [facility]

 

        2021 Orders          Doctor  AI    1.2.840.114 [id number]

88 Univers



        00:00:00 00:00:00 Only            Unassigned, HEIDY   350.1.13.10       

  ity of



                                        [facility] 4.2.7.2.686         Jack

as



                                                        [phone]         [address]







Results







           Test Description Test Time  Test Comments Results    Result Comments 

Source









                    POCT PREGNANCY TEST 2022 20:46:00 









                      Test Item  Value      Reference Range Interpretation Comme

nts









             POCT PREG (test code = 1605) Negative                              

 

 

             On board controls acceptable with C Line (test code = 3574) Yes    

                                

 

             POCT PREG LOT # (test code = 3575)                                 

       

 

             POCT PREG TEST  DATE (test code = 3576)                      

                  



[id number] 20[facility]46:00





             Test Item    Value        Reference Range Interpretation Comments

 

             POCT PREG (test code = 1605) Negative                              

 

 

             On board controls acceptable with C Yes                            

        



             Line (test code = 3574)                                        

 

             POCT PREG LOT # (test code = 3575)                                 

       

 

             POCT PREG TEST  DATE (test                                   

     



             code = 3576)                                        



[facility]

## 2022-12-05 NOTE — ER
Nurse's Notes                                                                                     

 [facility]                                                                 

Name: Loyda Trivedi                                                                                

Age: 22 yrs                                                                                       

Sex: Female                                                                                       

: 2000                                                                                   

MRN: [id number]                                                                                   

Arrival Date: 2022                                                                          

Time: 21:00                                                                                       

Account#: [id number]                                                                            

Bed Waiting                                                                                       

Private MD:                                                                                       

Diagnosis:                                                                                        

                                                                                                  

ED Course:                                                                                        

                                                                                             

21:00 Patient arrived in ED.                                                                  bp1 

21:45 Jonathan Tony PA is PHCP.                                                                cp  

21:45 [place] Shultz MD is Attending Physician.                                            cp  

                                                                                                  

Administered Medications:                                                                         

No medications were administered                                                                  

                                                                                                  

                                                                                                  

Outcome:                                                                                          

22:19 Patient left the ED.                                                                    bb  

                                                                                                  

Signatures:                                                                                       

Ijeoma Villegas RN                     RN   bb                                                   

Jonathan Tony PA PA   cp                                                   

Sheri Osullivan                           bp1                                                  

                                                                                                  

**************************************************************************************************

## 2024-12-27 ENCOUNTER — HOSPITAL ENCOUNTER (EMERGENCY)
Dept: HOSPITAL 97 - ER | Age: 24
Discharge: HOME | End: 2024-12-27
Payer: SELF-PAY

## 2024-12-27 VITALS — SYSTOLIC BLOOD PRESSURE: 108 MMHG | DIASTOLIC BLOOD PRESSURE: 55 MMHG

## 2024-12-27 VITALS — OXYGEN SATURATION: 100 % | TEMPERATURE: 98.4 F

## 2024-12-27 DIAGNOSIS — R42: Primary | ICD-10-CM

## 2024-12-27 LAB
ANION GAP SERPL CALC-SCNC: 7.8 MEQ/L (ref 5–15)
BUN BLD-MCNC: 8 MG/DL (ref 7–18)
GLUCOSE SERPLBLD-MCNC: 95 MG/DL (ref 74–106)
HCT VFR BLD CALC: 35.9 % (ref 36–45)
HGB BLD-MCNC: 11.3 G/DL (ref 12–15)
LYMPHOCYTES # SPEC AUTO: 2.6 K/UL (ref 0.7–4.9)
MCH RBC QN AUTO: 22.8 PG (ref 27–35)
MCHC RBC AUTO-ENTMCNC: 31.4 G/DL (ref 32–36)
MCV RBC: 72.6 FL (ref 80–100)
NRBC # BLD: 0 10*3/UL (ref 0–0)
NRBC BLD AUTO-RTO: 0 % (ref 0–0)
PMV BLD: 9 FL (ref 7.6–11.3)
POTASSIUM SERPL-SCNC: 3.8 MEQ/L (ref 3.5–5.1)
RBC # BLD: 4.94 M/UL (ref 3.86–4.86)
UA COMPLETE W REFLEX CULTURE PNL UR: (no result)
UA COMPLETE W REFLEX CULTURE PNL UR: (no result)
WBC # BLD AUTO: 8.3 THOU/UL (ref 4.3–10.9)

## 2024-12-27 PROCEDURE — 96361 HYDRATE IV INFUSION ADD-ON: CPT

## 2024-12-27 PROCEDURE — 99284 EMERGENCY DEPT VISIT MOD MDM: CPT

## 2024-12-27 PROCEDURE — 96374 THER/PROPH/DIAG INJ IV PUSH: CPT

## 2024-12-27 PROCEDURE — 93005 ELECTROCARDIOGRAM TRACING: CPT

## 2024-12-27 PROCEDURE — 80048 BASIC METABOLIC PNL TOTAL CA: CPT

## 2024-12-27 PROCEDURE — 84703 CHORIONIC GONADOTROPIN ASSAY: CPT

## 2024-12-27 PROCEDURE — 81001 URINALYSIS AUTO W/SCOPE: CPT

## 2024-12-27 PROCEDURE — 85025 COMPLETE CBC W/AUTO DIFF WBC: CPT

## 2024-12-27 PROCEDURE — 36415 COLL VENOUS BLD VENIPUNCTURE: CPT

## 2024-12-27 NOTE — EDPHYS
Physician Documentation                                                                           

 [facility]                                                                 

Name: Loyda Trivedi                                                                                

Age: 24 yrs                                                                                       

Sex: Female                                                                                       

: 2000                                                                                   

MRN: [id number]                                                                                   

Arrival Date: 2024                                                                          

Time: 15:43                                                                                       

Account#: [id number]                                                                            

Bed 17                                                                                            

Private MD:                                                                                       

ED Physician Messi Stout                                                                        

HPI:                                                                                              

                                                                                             

16:11 This 24 yrs old  Female presents to ER via Ambulatory with complaints of        ec2 

      Dizziness.                                                                                  

16:11 Patient arrives today for evaluation of dizziness. Onset of several days. History of    ec2 

      similar types of episodes. Reports no specific alleviating or exacerbating factors.         

      Reports that she believes is stress related. Reports otherwise some associated nausea,      

      no vomiting. No cough or cold symptoms. Has had some bouts of diarrhea recently. No         

      urinary complaints. LMP was greater than 1 month ago..                                      

                                                                                                  

Historical:                                                                                       

- Allergies:                                                                                      

16:10 PENICILLINS;                                                                            db  

- PMHx:                                                                                           

16:10 None;                                                                                   db  

                                                                                                  

- Immunization history:: Adult Immunizations unknown.                                             

- Infectious Disease History:: Denies.                                                            

- Social history:: Smoking status: Reported history of juuling and/or vaping.                     

                                                                                                  

                                                                                                  

ROS:                                                                                              

16:11 Constitutional: as per hpi                                                              ec2 

                                                                                                  

Exam:                                                                                             

16:11 Constitutional:  GEN: NAD                                                                   

Head: atraumatic                                                                                  

Eyes: EOMI                                                                                        

Ears: External ears are          ec2                                                              

      normal.                                                                                     

CV: regular rate                                                                                  

LUNGS: no respiratory distress                                                                    

ABD: non-distended                                                                                

SKIN: no                                                                                          

      evidence of rashes                                                                          

MSK: no evidence of trauma.  Neuro: Cranial nerves II through XII                                 

      intact, strength intact upper extremities, no pronator drift, normal                        

      finger-nose-finger.                                                                         

                                                                                                  

Vital Signs:                                                                                      

16:00  / 73; Pulse 74; Resp 16; Temp 98.4; Pulse Ox 100% ; Height 5 ft. 7 in. ;         db  

16:30  / 60; Pulse 55; Resp 15; Pulse Ox 100% ;                                         me1 

17:00  / 55; Pulse 55; Resp 15; Temp 98.4; Pulse Ox 100% ;                              me1 

                                                                                                  

MDM:                                                                                              

16:05 Medical Screening Exam initiated                                                        ec2 

16:11 Data reviewed: vital signs, nurses notes. ED course: Patient arrives today for          ec2 

      evaluation of dizziness. Examination yields an intact neurologic exam. Will obtain lab      

      work, urine studies and EKG. Differential includes peripheral vertigo, electrolyte          

      disturbances, anemia, arrhythmia. Doubt central vertigo..                                   

16:34 ED course: EKG independently reviewed and interpreted by me, shows normal sinus rhythm, ec2 

      rate of 63, no acute ST segment elevations, intervals are nonactionable, benign early       

      repolarization noted..                                                                      

16:56 ED course: Lab work unrevealing. Will discharge home. Return precautions given. Suspect ec2 

      peripheral vertigo. .                                                                       

                                                                                                  

                                                                                             

16:06 Order name: Basic Metabolic Panel; Complete Time: 16:56                                 ec2 

                                                                                             

16:06 Order name: CBC with Diff; Complete Time: 16:45                                         ec2 

                                                                                             

16:06 Order name: Pregnancy Test, Serum; Complete Time: 16:56                                 ec2 

                                                                                             

16:06 Order name: UAM; Complete Time: 16:45                                                   ec2 

                                                                                             

16:06 Order name: Cardiac monitoring; Complete Time: 16:33                                    ec2 

                                                                                             

16:06 Order name: EKG - Nurse/Tech; Complete Time: 16:33                                      ec2 

                                                                                             

16:06 Order name: IV Saline Lock; Complete Time: 16:24                                        ec2 

                                                                                             

16:06 Order name: Labs collected and sent; Complete Time: 16:24                               ec2 

                                                                                             

16:06 Order name: O2 Per Protocol; Complete Time: 16:24                                       ec2 

                                                                                             

16:06 Order name: O2 Sat Monitoring; Complete Time: 16:24                                     ec2 

                                                                                                  

Administered Medications:                                                                         

16:38 Drug: NS 0.9%  ml 500 ml IV at 1 bolus once; to be given as a bolus over 30       me1 

      minutes Volume: 500 ml; Route: IV; Rate: 1 bolus; Site: right antecubital;                  

17:23 Follow up: Response: No adverse reaction; IV Status: Completed infusion; IV Intake:     me1 

      500ml                                                                                       

16:38 Drug: Ondansetron IVP 4 mg IVP once; over 2 minutes Route: IVP; Site: right antecubital;me1 

17:23 Follow up: Response: Nausea is decreased                                                me1 

                                                                                                  

                                                                                                  

Disposition Summary:                                                                              

24 16:57                                                                                    

Discharge Ordered                                                                                 

 Notes:       Location: Home                                                                        
  ec2

      Condition: Stable                                                                       ec2 

      Diagnosis                                                                                   

        - Dizziness and giddiness                                                             ec2 

      Followup:                                                                               ec2 

        - With: Private Physician                                                                  

        - When:                                                                                    

        - Reason: Re-evaluation by your physician                                                  

      Discharge Instructions:                                                                     

        - Discharge Summary Sheet                                                             ec2 

        - Dizziness, Easy-to-Read                                                             ec2 

      Forms:                                                                                      

        - Work release form                                                                   me1 

        - Medication Reconciliation Form                                                      ec2 

        - Antibiotic Education                                                                ec2 

        - Prescription Opioid Use                                                             ec2 

        - Patient Portal Instructions                                                         ec2 

        - Leadership Thank You Letter                                                         ec2 

      Prescriptions:                                                                              

        - Meclizine 25 mg Oral Tablet                                                              

            - take 1 tablet ORAL route every 8 hours As needed; 30 tablet; Refills: 0,        ec2 

      Product Selection Permitted                                                                 

Signatures:                                                                                       

Dispatcher [employer]                           Evita Wesley, RN                    HOLLY   db                                                   

Berta Vanessa RN RN   me1                                                  

Messi Stout MD MD   ec2                                                  

                                                                                                  

Corrections: (The following items were deleted from the chart)                                    

16:06 16:06 BASIC METABOLIC PANEL+C.LAB.BRZ ordered. EDMS                                     EDMS

16: 16:06 CBC+H.LAB.BRZ ordered. EDMS                                                       EDMS

16:06 16:06 PREGNANCY TEST, SERUM+SC.LAB.BRZ ordered. EDMS                                    EDMS

16:06 16:06 Urinalysis W/Microscopic+U.LAB.BRZ ordered. EDMS                                  EDMS

                                                                                                  

**************************************************************************************************

## 2024-12-27 NOTE — ER
Nurse's Notes                                                                                     

 [facility]                                                                 

Name: Loyda Trivedi                                                                                

Age: 24 yrs                                                                                       

Sex: Female                                                                                       

: 2000                                                                                   

MRN: [id number]                                                                                   

Arrival Date: 2024                                                                          

Time: 15:43                                                                                       

Account#: [id number]                                                                            

Bed 17                                                                                            

Private MD:                                                                                       

Diagnosis: Dizziness and giddiness                                                                

                                                                                                  

Presentation:                                                                                     

                                                                                             

16:00 Chief complaint: Patient states: DIZZINESS INCREASED WITH AMBULATION WITH NAUSEA AND    db  

      DIARRHEA X 3 DAYS. PT AMBULATORY WITH STEADY GATE. Coronavirus screen: Client denies        

      travel out of the [place] in the last 14 days. At this time, the client does not indicate      

      any symptoms associated with coronavirus-19. Ebola Screen: Patient negative for fever       

      greater than or equal to 101.5 degrees Fahrenheit, and additional compatible Ebola          

      Virus Disease symptoms Patient denies exposure to infectious person. Patient denies         

      travel to an Ebola-affected area in the 21 days before illness onset. No symptoms or        

      risks identified at this time. Initial Sepsis Screen: Does the patient meet any 2           

      criteria? No. Patient's initial sepsis screen is negative. Does the patient have a          

      suspected source of infection? No. Patient's initial sepsis screen is negative. Risk        

      Assessment: Do you want to hurt yourself or someone else? Patient reports no desire to      

      harm self or others. Onset of symptoms was 2024.                               

16:00 Method Of Arrival: Ambulatory                                                           db  

16:00 Acuity: SUMIT 3                                                                           db  

                                                                                                  

Triage Assessment:                                                                                

16:10 General: Appears in no apparent distress. comfortable, Behavior is calm, cooperative.   db  

      Pain: Denies pain. Neuro: Level of Consciousness is awake, alert, obeys commands,           

      Oriented to person, place, time, situation. Respiratory: Airway is patent Respiratory       

      effort is even, unlabored, Respiratory pattern is regular, symmetrical. GI: Reports         

      diarrhea, nausea.                                                                           

                                                                                                  

Historical:                                                                                       

- Allergies:                                                                                      

16:10 PENICILLINS;                                                                            db  

- PMHx:                                                                                           

16:10 None;                                                                                   db  

                                                                                                  

- Immunization history:: Adult Immunizations unknown.                                             

- Infectious Disease History:: Denies.                                                            

- Social history:: Smoking status: Reported history of juuling and/or vaping.                     

                                                                                                  

                                                                                                  

Screenin:15 [facility] ED Fall Risk Assessment (Adult) History of falling in the last 3 months,       me1 

      including since admission No falls in past 3 months (0 pts) Confusion or Disorientation     

      No (0 pts) Intoxicated or Sedated No (0 pts) Impaired Gait No (0 pts) Mobility Assist       

      Device Used No (0 pt) Altered Elimination No (0 pt) Score/Fall Risk Level 0 - 2 = Low       

      Risk Maintained a safe environment, Provided non-skid footwear, Hourly rounding (assess     

      needs \T\ fall precautionary measures) done. Abuse screen: Denies threats or abuse.         

      Nutritional screening: No deficits noted. Tuberculosis screening: No symptoms or risk       

      factors identified.                                                                         

                                                                                                  

Assessment:                                                                                       

16:15 General: Appears comfortable, well groomed, well developed, well nourished, Behavior is me1 

      calm, cooperative, appropriate for age, Reports DIZZINESS INCREASED WITH AMBULATION         

      WITH NAUSEA AND DIARRHEA X 3 DAYS. PT AMBULATORY WITH STEADY GATE. Pain: Denies pain.       

      Neuro: Level of Consciousness is awake, alert, obeys commands, Oriented to person,          

      place, time, situation, Appropriate for age Reports dizziness. Cardiovascular:              

      Patient's skin is warm and dry. Respiratory: Airway is patent Respiratory effort is         

      even, unlabored, Respiratory pattern is regular, symmetrical. GI: Reports diarrhea,         

      nausea, since x3 days. : No signs and/or symptoms were reported regarding the             

      genitourinary system. EENT: No signs and/or symptoms were reported regarding the EENT       

      system. Derm: Skin is intact, is healthy with good turgor, Skin is pink, warm \T\ dry.      

      Musculoskeletal: No signs and/or symptoms reported regarding the musculoskeletal system.    

                                                                                                  

Vital Signs:                                                                                      

16:00  / 73; Pulse 74; Resp 16; Temp 98.4; Pulse Ox 100% ; Height 5 ft. 7 in. ;         db  

16:30  / 60; Pulse 55; Resp 15; Pulse Ox 100% ;                                         me1 

17:00  / 55; Pulse 55; Resp 15; Temp 98.4; Pulse Ox 100% ;                              me1 

                                                                                                  

ED Course:                                                                                        

15:46 Patient arrived in ED.                                                                  mr  

15:58 Messi Stout MD is Attending Physician.                                               ec2 

16:10 Triage completed.                                                                       db  

16:10 Arm band placed on Patient placed in an exam room.                                      db  

16:13 Berta Vanessa, RN is Primary Nurse.                                                me1 

16:15 Patient has correct armband on for positive identification. Bed in low position. Call   me1 

      light in reach. Side rails up X2. Provided Education on: POC. Verbalized                    

      understanding.. Client placed on continuous cardiac and pulse oximetry monitoring. NIBP     

      monitoring applied. Cardiac monitor on. Pulse ox on. NIBP on.                               

16:15 No provider procedures requiring assistance completed.                                  me1 

16:24 Initial lab(s) drawn, by me, sent to lab. Urine collected: clean catch specimen,        me1 

      cloudy. Inserted saline lock: 22 gauge in right antecubital area, using aseptic             

      technique.                                                                                  

16:24 [facility] Sent.                                                                               me1 

16:24 Pregnancy Test, Serum Sent.                                                             me1 

16:25 Basic Metabolic Panel Sent.                                                             me1 

16:25 CBC with Diff Sent.                                                                     me1 

                                                                                                  

Administered Medications:                                                                         

16:38 Drug: NS 0.9%  ml 500 ml IV at 1 bolus once; to be given as a bolus over 30       me1 

      minutes Volume: 500 ml; Route: IV; Rate: 1 bolus; Site: right antecubital;                  

17:23 Follow up: Response: No adverse reaction; IV Status: Completed infusion; IV Intake:     me1 

      500ml                                                                                       

16:38 Drug: Ondansetron IVP 4 mg IVP once; over 2 minutes Route: IVP; Site: right antecubital;me1 

17:23 Follow up: Response: Nausea is decreased                                                me1 

                                                                                                  

                                                                                                  

Medication:                                                                                       

16:15 VIS not applicable for this client.                                                     me1 

                                                                                                  

Intake:                                                                                           

17:23 IV: 500ml; Total: 500ml.                                                                me1 

                                                                                                  

Outcome:                                                                                          

16:57 Discharge ordered by MD.                                                                ec2 

17:23 Patient left the ED.                                                                    me1 

                                                                                                  

Signatures:                                                                                       

HansenRosemary rodarte, Reg                       Reg                                                     

IbanezEvita, RN                    RN   db                                                   

Berta Vanessa RN                  RN   me1                                                  

Messi Stout MD MD   ec2                                                  

                                                                                                  

Corrections: (The following items were deleted from the chart)                                    

17:10 16:00 Chief complaint: Patient states: DIZZINESS INCREASED WITH AMBULATION WITH NAUSEA  me1 

      AND DIARRHEA X 3 DAYS. PT AMBULATORY WITH STEADY GATE db                                    

                                                                                                  

**************************************************************************************************

## 2024-12-30 NOTE — EKG
Test Date:    2024-12-27               Test Time:    16:30:24

Technician:   ME                                     

                                                     

MEASUREMENT RESULTS:                                       

Intervals:                                           

Rate:         63                                     

VA:           140                                    

QRSD:         82                                     

QT:           414                                    

QTc:          423                                    

Axis:                                                

P:            31                                     

VA:           140                                    

QRS:          85                                     

T:            92                                     

                                                     

INTERPRETIVE STATEMENTS:                                       

                                                     

*** Age and gender specific ECG analysis ***

Normal sinus rhythm

ST elevation, consider inferior injury or acute infarct Vs early

repolarization

Abnormal ECG

No previous ECG available for comparison



Electronically Signed On 12-30-24 11:13:54 CST by Son Ayoub Propranolol Pregnancy And Lactation Text: This medication is Pregnancy Category C and it isn't known if it is safe during pregnancy. It is excreted in breast milk.